# Patient Record
(demographics unavailable — no encounter records)

---

## 2017-02-24 DIAGNOSIS — E11.9 DIABETES TYPE 2, CONTROLLED (HCC): ICD-10-CM

## 2017-02-24 RX ORDER — GLIPIZIDE 5 MG/1
TABLET ORAL
Qty: 60 TABLET | Refills: 0 | OUTPATIENT
Start: 2017-02-24

## 2017-02-27 RX ORDER — GLIPIZIDE 5 MG/1
TABLET ORAL
Qty: 60 TABLET | Refills: 0 | Status: SHIPPED | OUTPATIENT
Start: 2017-02-27

## 2017-03-28 RX ORDER — LOSARTAN POTASSIUM 50 MG/1
TABLET ORAL
Qty: 30 TABLET | Refills: 0 | Status: SHIPPED | OUTPATIENT
Start: 2017-03-28

## 2017-07-02 DIAGNOSIS — E53.8 COBALAMIN DEFICIENCY: ICD-10-CM

## 2017-07-03 RX ORDER — CYANOCOBALAMIN 1000 UG/ML
INJECTION, SOLUTION INTRAMUSCULAR; SUBCUTANEOUS
Refills: 0 | OUTPATIENT
Start: 2017-07-03

## 2017-07-03 NOTE — TELEPHONE ENCOUNTER
Based on her last labs and visit 9/16, she does not need B12 injection. Furthermore we give this in   our office as opposed to her giving this at home

## 2017-07-07 DIAGNOSIS — E53.8 COBALAMIN DEFICIENCY: ICD-10-CM

## 2017-07-07 RX ORDER — CYANOCOBALAMIN 1000 UG/ML
INJECTION, SOLUTION INTRAMUSCULAR; SUBCUTANEOUS
Refills: 0 | OUTPATIENT
Start: 2017-07-07

## 2017-07-07 NOTE — TELEPHONE ENCOUNTER
Based on last labs she does not need B12. I do not give B12  to where they give the injections at their home anyway.